# Patient Record
Sex: FEMALE | Race: WHITE
[De-identification: names, ages, dates, MRNs, and addresses within clinical notes are randomized per-mention and may not be internally consistent; named-entity substitution may affect disease eponyms.]

---

## 2018-12-15 ENCOUNTER — HOSPITAL ENCOUNTER (INPATIENT)
Dept: HOSPITAL 4 - SED | Age: 83
LOS: 2 days | Discharge: HOME | DRG: 281 | End: 2018-12-17
Attending: INTERNAL MEDICINE | Admitting: INTERNAL MEDICINE
Payer: MEDICARE

## 2018-12-15 VITALS — BODY MASS INDEX: 21.51 KG/M2 | HEIGHT: 64 IN | WEIGHT: 126 LBS

## 2018-12-15 VITALS — SYSTOLIC BLOOD PRESSURE: 158 MMHG

## 2018-12-15 DIAGNOSIS — I10: ICD-10-CM

## 2018-12-15 DIAGNOSIS — Z88.1: ICD-10-CM

## 2018-12-15 DIAGNOSIS — F03.90: ICD-10-CM

## 2018-12-15 DIAGNOSIS — M06.9: ICD-10-CM

## 2018-12-15 DIAGNOSIS — I21.A1: Primary | ICD-10-CM

## 2018-12-15 DIAGNOSIS — Z88.2: ICD-10-CM

## 2018-12-15 DIAGNOSIS — E44.1: ICD-10-CM

## 2018-12-15 PROCEDURE — G0378 HOSPITAL OBSERVATION PER HR: HCPCS

## 2018-12-15 NOTE — NUR
Patient brought in by EMS. aaox4 and able to verbalize her needs. Able to 
ambulate with a steady. Complaints s/p fall from standing position which was 
unwitnessed. Patient states she was twisting her body to place a device on a 
craddle. States from foot came out of her slippers and tripped. Patient hit her 
head on the table and injured left side and left head with with laceration. 
Pain is at 8/10 and the back. Denies any n/v and sob. Denies any fever.

## 2018-12-16 VITALS — SYSTOLIC BLOOD PRESSURE: 143 MMHG

## 2018-12-16 VITALS — SYSTOLIC BLOOD PRESSURE: 151 MMHG

## 2018-12-16 VITALS — SYSTOLIC BLOOD PRESSURE: 149 MMHG

## 2018-12-16 VITALS — SYSTOLIC BLOOD PRESSURE: 138 MMHG

## 2018-12-16 VITALS — SYSTOLIC BLOOD PRESSURE: 146 MMHG

## 2018-12-16 LAB
ALBUMIN SERPL BCP-MCNC: 2.9 G/DL (ref 3.4–4.8)
ALT SERPL W P-5'-P-CCNC: 13 U/L (ref 12–78)
ANION GAP SERPL CALCULATED.3IONS-SCNC: 8 MMOL/L (ref 5–15)
APPEARANCE UR: CLEAR
AST SERPL W P-5'-P-CCNC: 17 U/L (ref 10–37)
BACTERIA URNS QL MICRO: (no result) /HPF
BASOPHILS # BLD AUTO: 0 K/UL (ref 0–0.2)
BASOPHILS NFR BLD AUTO: 0.2 % (ref 0–2)
BILIRUB SERPL-MCNC: 0.4 MG/DL (ref 0–1)
BILIRUB UR QL STRIP: NEGATIVE
BUN SERPL-MCNC: 27 MG/DL (ref 8–21)
CALCIUM SERPL-MCNC: 9 MG/DL (ref 8.4–11)
CHLORIDE SERPL-SCNC: 104 MMOL/L (ref 98–107)
COLOR UR: YELLOW
CREAT SERPL-MCNC: 1 MG/DL (ref 0.55–1.3)
EOSINOPHIL # BLD AUTO: 0.2 K/UL (ref 0–0.4)
EOSINOPHIL NFR BLD AUTO: 1.7 % (ref 0–4)
ERYTHROCYTE [DISTWIDTH] IN BLOOD BY AUTOMATED COUNT: 14 % (ref 9–15)
GFR SERPL CREATININE-BSD FRML MDRD: (no result) ML/MIN (ref 90–?)
GLUCOSE SERPL-MCNC: 116 MG/DL (ref 70–99)
GLUCOSE UR STRIP-MCNC: NEGATIVE MG/DL
HCT VFR BLD AUTO: 33.6 % (ref 36–48)
HGB BLD-MCNC: 11.3 G/DL (ref 12–16)
HGB UR QL STRIP: NEGATIVE
KETONES UR STRIP-MCNC: NEGATIVE MG/DL
LEUKOCYTE ESTERASE UR QL STRIP: (no result)
LYMPHOCYTES # BLD AUTO: 0.8 K/UL (ref 1–5.5)
LYMPHOCYTES NFR BLD AUTO: 6.3 % (ref 20.5–51.5)
MCH RBC QN AUTO: 31 PG (ref 27–31)
MCHC RBC AUTO-ENTMCNC: 34 % (ref 32–36)
MCV RBC AUTO: 93 FL (ref 79–98)
MONOCYTES # BLD MANUAL: 0.9 K/UL (ref 0–1)
MONOCYTES # BLD MANUAL: 7.2 % (ref 1.7–9.3)
NEUTROPHILS # BLD AUTO: 10 K/UL (ref 1.8–7.7)
NEUTROPHILS NFR BLD AUTO: 84.6 % (ref 40–70)
NITRITE UR QL STRIP: NEGATIVE
PH UR STRIP: 6 [PH] (ref 5–8)
PLATELET # BLD AUTO: 187 K/UL (ref 130–430)
POTASSIUM SERPL-SCNC: 4.2 MMOL/L (ref 3.5–5.1)
PROT UR QL STRIP: NEGATIVE
RBC # BLD AUTO: 3.62 MIL/UL (ref 4.2–6.2)
RBC #/AREA URNS HPF: (no result) /HPF (ref 0–3)
SODIUM SERPLBLD-SCNC: 139 MMOL/L (ref 136–145)
SP GR UR STRIP: 1.02 (ref 1–1.03)
UROBILINOGEN UR STRIP-MCNC: 0.2 MG/DL (ref 0.2–1)
WBC # BLD AUTO: 11.9 K/UL (ref 4.8–10.8)
WBC #/AREA URNS HPF: (no result) /HPF (ref 0–3)

## 2018-12-16 RX ADMIN — Medication SCH MG: at 09:19

## 2018-12-16 RX ADMIN — Medication SCH MG: at 21:21

## 2018-12-16 RX ADMIN — Medication SCH MG: at 08:49

## 2018-12-16 RX ADMIN — DEXTROSE MONOHYDRATE SCH MLS/HR: 50 INJECTION, SOLUTION INTRAVENOUS at 08:48

## 2018-12-16 RX ADMIN — ENOXAPARIN SODIUM SCH MG: 40 INJECTION SUBCUTANEOUS at 08:49

## 2018-12-16 RX ADMIN — Medication SCH MG: at 15:13

## 2018-12-16 NOTE — NUR
INITIAL NOTES:

 PT is awake, alert, hard of hearing, assisted to bathroom, steady gait. no sob and 
distress, back to bed. pt os hard of hearing, redness noted at left and right buttocks, 
ecchymosis at left lower and and left elbow. needs attended. call light in reach. will give 
report to marisa moser night nurse.

## 2018-12-16 NOTE — NUR
Medication reconciliation completed with information provided by son. Any prior 
medication reconciliation on file was reviewed and corrected.

-------------------------------------------------------------------------------

Addendum: 12/16/18 at 0435 by [email or web address]

-------------------------------------------------------------------------------

Medications reconcilation given by son via phone. SonaDryl was reading 
medication bottles. Per pt, "those are not the right medications. You need to 
contact Cortney, my caregiver." Son gave nurse [facility] phone number to verify 
medication list, [facility] [phone] and mail order [phone]. Pharmacy did 
not answer.

## 2018-12-16 NOTE — NUR
Patient has a 0.5 cm laceration to left occiput.  Dr. Echeverria applied sivakumar 
using sterile technique.  Edges well approximated.  Site cleansed with 
Betadine.  No bleeding noted.  Pt tolerated well.

## 2018-12-16 NOTE — NUR
# 20 gauge angiocath placed to Left wrist.  Use of asceptic technique.  Opsite 
placed over site.  Blood return noted. Flushed with 10 cc of normal saline.  No 
evidence of infiltration noted.  Patient tolerated well.

## 2018-12-16 NOTE — NUR
ASSISTED TO BATHROOM TO VOID,WITH STEADY GAIT. BOTH HAND FINGERS DEFORMED DUE TO RA AND OA. 
PAIN TOLERABLE. REFUSE PAIN MEDS.

## 2018-12-16 NOTE — NUR
Transfer to Telemetry via ACLS protocol. Licensed nurse present. IV present no 
signs or symptoms of infiltration.

## 2018-12-16 NOTE — NUR
RN ROUNDING:

PATIENT JUST FINISHED HAVING LUNCH,REQUESTED TO PUT AWAY THE TRAY. NO DISTRESS. CALL LIGHT 
WITH IN REACH. BED LOCKED AT LOWEST POSITION. BED ALARM ON.

## 2018-12-16 NOTE — NUR
Patient will be admitted to care of Dr. Abdi.  Admitted to Telemetry unit.  
Will go to room 104 B.  Belongings list completed.  Summary report printed. 
Report will be given at bedside.

## 2018-12-16 NOTE — NUR
SKIN INTEGRITY:

POSTERIOR HEAD WITH SMALL LACERATION WITH 3 STAPLES. NO REDNESS NOR DRAINAGE. BOTH ELBOWS  
LEFT FLANK ,HIPS , AND SACRAL AREAS WITH REDNESS AND BRUISING.NO SKIN BREAKDOWN.

## 2018-12-16 NOTE — NUR
MEDS ADMIN:

PATIENT RESTING QUITELY. HARD OF HEARING IN BOTH EARS WITH HEARING AIDS ON.TOOK ALL DUE  PO 
MEDS WITHOUT DIFFICULTY.SALINE LOCK FLUSHED EASILY.

## 2018-12-16 NOTE — NUR
CLOSING NOTES:

PATIENT ON THE BED.RESTING.JUST ATE DINNER. CALL LIGHT WITH IN REACH. BED LOCKED AT LOWEST 
POSITION.BED ALARM ON.CONTINUE TO MONITOR.

## 2018-12-16 NOTE — NUR
cardio rounds:

Dr gotti came and saw the patient ,spoke to patient's son and patient ,updates and plan of 
care given.

## 2018-12-16 NOTE — NUR
Closing Notes



Patient resting in bed with eyes closed, easily aroused.  Patient denies any acute distress 
or pain at this time.  Breathing is even and unlabored.  IV site patent/clean/dry, no S/S 
infection/infiltration noted.  Needs addressed throughout shift.  Call light in hand, fall 
precautions in place.  Will continue to monitor for changes and safety and endorse all 
patient care/needs to oncoming nurse.

## 2018-12-16 NOTE — NUR
ADMISSION NOTE

Received patient from ER via peter, received report from KP TURNER. Patient admitted with 
diagnosis of ELEVATED TROPONIN. Patient oriented to hospital routine, call light, toileting 
and safety-patient verbalized understanding.

## 2018-12-16 NOTE — NUR
Am Rounds:

Patient awake during rounds. Bedside report given by night nurse frank. Hard of hearing. Not 
in any distress. Call light with in reach. Bed locked at lowest position. Bed alarm on.

## 2018-12-16 NOTE — NUR
cardio:

Spoke with Dr. gotti and aware about the consult and relayed elevated troponin.Ordered echo 
and repeat troponin,but Dr lake put the order in already.

## 2018-12-16 NOTE — NUR
BRP WITH ASSIST:

STUDENT NURSE ASSISTED PATIENT TO THE TOILET,GOOD GAIT.STUDENT NURSE STAYED WITH THE PATIENT 
WHILE IN THE TOILET.NEEDS SUPERVISION AT ALL TIMES.

## 2018-12-17 VITALS — SYSTOLIC BLOOD PRESSURE: 143 MMHG

## 2018-12-17 VITALS — SYSTOLIC BLOOD PRESSURE: 137 MMHG

## 2018-12-17 VITALS — SYSTOLIC BLOOD PRESSURE: 161 MMHG

## 2018-12-17 LAB
ALBUMIN SERPL BCP-MCNC: 2.8 G/DL (ref 3.4–4.8)
ALT SERPL W P-5'-P-CCNC: 14 U/L (ref 12–78)
ANION GAP SERPL CALCULATED.3IONS-SCNC: 7 MMOL/L (ref 5–15)
AST SERPL W P-5'-P-CCNC: 24 U/L (ref 10–37)
BASOPHILS # BLD AUTO: 0 K/UL (ref 0–0.2)
BASOPHILS NFR BLD AUTO: 0.5 % (ref 0–2)
BILIRUB SERPL-MCNC: 0.8 MG/DL (ref 0–1)
BUN SERPL-MCNC: 20 MG/DL (ref 8–21)
CALCIUM SERPL-MCNC: 9.4 MG/DL (ref 8.4–11)
CHLORIDE SERPL-SCNC: 106 MMOL/L (ref 98–107)
CREAT SERPL-MCNC: 0.88 MG/DL (ref 0.55–1.3)
EOSINOPHIL # BLD AUTO: 0.2 K/UL (ref 0–0.4)
EOSINOPHIL NFR BLD AUTO: 3.7 % (ref 0–4)
ERYTHROCYTE [DISTWIDTH] IN BLOOD BY AUTOMATED COUNT: 14.3 % (ref 9–15)
GFR SERPL CREATININE-BSD FRML MDRD: (no result) ML/MIN (ref 90–?)
GLUCOSE SERPL-MCNC: 85 MG/DL (ref 70–99)
HCT VFR BLD AUTO: 31.3 % (ref 36–48)
HGB BLD-MCNC: 10.7 G/DL (ref 12–16)
LYMPHOCYTES # BLD AUTO: 0.9 K/UL (ref 1–5.5)
LYMPHOCYTES NFR BLD AUTO: 15.5 % (ref 20.5–51.5)
MCH RBC QN AUTO: 31 PG (ref 27–31)
MCHC RBC AUTO-ENTMCNC: 34 % (ref 32–36)
MCV RBC AUTO: 92 FL (ref 79–98)
MONOCYTES # BLD MANUAL: 0.5 K/UL (ref 0–1)
MONOCYTES # BLD MANUAL: 8.2 % (ref 1.7–9.3)
NEUTROPHILS # BLD AUTO: 3.9 K/UL (ref 1.8–7.7)
NEUTROPHILS NFR BLD AUTO: 72.1 % (ref 40–70)
PLATELET # BLD AUTO: 166 K/UL (ref 130–430)
POTASSIUM SERPL-SCNC: 4.3 MMOL/L (ref 3.5–5.1)
RBC # BLD AUTO: 3.41 MIL/UL (ref 4.2–6.2)
SODIUM SERPLBLD-SCNC: 141 MMOL/L (ref 136–145)
WBC # BLD AUTO: 5.5 K/UL (ref 4.8–10.8)

## 2018-12-17 RX ADMIN — ENOXAPARIN SODIUM SCH MG: 40 INJECTION SUBCUTANEOUS at 09:13

## 2018-12-17 RX ADMIN — DEXTROSE MONOHYDRATE SCH MLS/HR: 50 INJECTION, SOLUTION INTRAVENOUS at 09:12

## 2018-12-17 RX ADMIN — Medication SCH MG: at 09:13

## 2018-12-17 NOTE — NUR
CALLED TO VOID IN THE BATHROOM. ASSISTED ,GAIT STEADY. DENIES CHEST PAIN. HAS TOLERABLE 
PAIN. REFUSE PAIN MEDS.

## 2018-12-17 NOTE — NUR
transition of care note

All transition of care instructions were provided to the Katelin Oscar, the patient's 
daughter. She verbalized understanding of all. IV and ID band were removed. Patient  left 
the unit in stable condition.

## 2018-12-17 NOTE — NUR
CLOSING:

ORIENTED X3. HAD BEEN ASSISTING TO BATHROOM TO VOID. OFFERED PAIN MED X3 BUT REFUSE. 
VERBALIZED FEELS OKAY WITHOUT ANY DRUGS. PAIN TOLERABLE. NO ACUTE CARDIOPULMONARY DISTRESS. 
CALL LIGHT WITHIN REACH. SAFETY MEASURES OBSERVED. BED ALARM ON.

## 2018-12-17 NOTE — NUR
Nutrition Update



Krishna Scale 17 noted.

Pt admitted for elevated troponin

Diet: cardiac low cholesterol low fat 2gm Na diet

BMI: 21.6 kg/m2

RD to follow per nutrition care standards.

## 2018-12-17 NOTE — NUR
Opening Note

Report received from [facility] shift nurse. Patient is resting in bed. No signs of distress noted. 
IV is on the left wrist 20g, saline locked. Call light is within reach and bed is in low 
position. Will continue to monitor troponin levels.

## 2018-12-19 NOTE — NUR
DISCHARGE FOLLOW UP PHONE CALL:

MSW phoned pt who states she is doing fair. Pt states she has an appointment with PCP, Dr. Gonzalez on 12/21 and dtr will be driving her to MD appointment. Pt did not have any 
questions about discharge instructions but asked information regarding [facility]. MSW provided 
[facility] phone number. No further needs/concerns at this time but will call if any arise.

## 2019-11-05 ENCOUNTER — HOSPITAL ENCOUNTER (EMERGENCY)
Dept: HOSPITAL 4 - SED | Age: 84
Discharge: HOME | End: 2019-11-05
Payer: COMMERCIAL

## 2019-11-05 VITALS — HEIGHT: 64 IN | BODY MASS INDEX: 19.63 KG/M2 | WEIGHT: 115 LBS

## 2019-11-05 VITALS — SYSTOLIC BLOOD PRESSURE: 136 MMHG

## 2019-11-05 VITALS — SYSTOLIC BLOOD PRESSURE: 159 MMHG

## 2019-11-05 DIAGNOSIS — S46.912A: ICD-10-CM

## 2019-11-05 DIAGNOSIS — R03.0: ICD-10-CM

## 2019-11-05 DIAGNOSIS — J45.909: ICD-10-CM
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
## 2019-11-05 DIAGNOSIS — Z79.82: ICD-10-CM
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
## 2019-11-05 DIAGNOSIS — Z79.899: ICD-10-CM

## 2019-11-05 DIAGNOSIS — Z88.1: ICD-10-CM

## 2019-11-05 DIAGNOSIS — S20.222A: ICD-10-CM

## 2019-11-05 DIAGNOSIS — S51.011A: Primary | ICD-10-CM

## 2019-11-05 RX ADMIN — CLOSTRIDIUM TETANI TOXOID ANTIGEN (FORMALDEHYDE INACTIVATED) AND CORYNEBACTERIUM DIPHTHERIAE TOXOID ANTIGEN (FORMALDEHYDE INACTIVATED) ONE ML: 5; 2 INJECTION, SUSPENSION INTRAMUSCULAR at 16:02

## 2019-11-05 RX ADMIN — BACITRACIN ZINC ONE GM: 500 OINTMENT TOPICAL at 16:01

## 2019-11-05 RX ADMIN — HYDROMORPHONE HYDROCHLORIDE ONE MG: 8 TABLET ORAL at 16:01

## 2019-12-19 ENCOUNTER — HOSPITAL ENCOUNTER (EMERGENCY)
Dept: HOSPITAL 4 - SED | Age: 84
Discharge: HOME | End: 2019-12-19
Payer: MEDICARE

## 2019-12-19 VITALS — BODY MASS INDEX: 19.63 KG/M2 | HEIGHT: 64 IN | WEIGHT: 115 LBS

## 2019-12-19 VITALS — SYSTOLIC BLOOD PRESSURE: 148 MMHG

## 2019-12-19 VITALS — SYSTOLIC BLOOD PRESSURE: 160 MMHG

## 2019-12-19 DIAGNOSIS — J45.909: ICD-10-CM

## 2019-12-19 DIAGNOSIS — Z79.899: ICD-10-CM
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
## 2019-12-19 DIAGNOSIS — Z79.82: ICD-10-CM

## 2019-12-19 DIAGNOSIS — S01.81XA: ICD-10-CM

## 2019-12-19 DIAGNOSIS — S01.511A: Primary | ICD-10-CM

## 2019-12-19 DIAGNOSIS — S51.812A: ICD-10-CM

## 2019-12-19 DIAGNOSIS — S51.811A: ICD-10-CM

## 2019-12-19 DIAGNOSIS — Z88.1: ICD-10-CM

## 2019-12-19 PROCEDURE — 99284 EMERGENCY DEPT VISIT MOD MDM: CPT

## 2019-12-19 PROCEDURE — 12013 RPR F/E/E/N/L/M 2.6-5.0 CM: CPT

## 2019-12-19 NOTE — NUR
Patient has a 2 cm laceration to Chin .  Dr. Joseph applied sutures using sterile 
technique.  Edges well approximated.  Site cleansed with sterile water.  
Dressing of nonadhesive gauze  applied to site.  No bleeding noted.  Pt 
tolerated well.

## 2019-12-19 NOTE — NUR
Patient presented to ER with lip laceration. Patient A&Ox4, [place], 
laceration:Lip, Left forarm & chin. skin tear: Left knuckles, Right arm, cap 
refill <3, pain5/10, denies N/V/D. Patient states she fell at home while phone 
was ringing. Daughter of patient at bedside states fall was unwittnessed.

## 2019-12-19 NOTE — NUR
Patient given written and verbal discharge instructions and verbalizes 
understanding.  ER MD Joseph discussed with patient the results and treatment 
provided. Patient in stable condition. ID arm band removed. Rx of Clindamycin 
given. Patient educated on pain management and to follow up with PMD. Pain 
Scale 0. Opportunity for questions provided and answered. Medication side 
effect fact sheet provided.

## 2020-07-29 ENCOUNTER — HOSPITAL ENCOUNTER (EMERGENCY)
Dept: HOSPITAL 4 - SED | Age: 85
Discharge: HOME | End: 2020-07-29
Payer: MEDICARE

## 2020-07-29 VITALS — HEIGHT: 61 IN | WEIGHT: 125 LBS | BODY MASS INDEX: 23.6 KG/M2

## 2020-07-29 VITALS — SYSTOLIC BLOOD PRESSURE: 154 MMHG

## 2020-07-29 DIAGNOSIS — Z79.899: ICD-10-CM

## 2020-07-29 DIAGNOSIS — S01.01XA: Primary | ICD-10-CM
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
## 2020-07-29 DIAGNOSIS — J45.909: ICD-10-CM

## 2020-07-29 DIAGNOSIS — M06.9: ICD-10-CM

## 2020-07-29 DIAGNOSIS — Z79.82: ICD-10-CM
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
## 2020-07-29 DIAGNOSIS — Z88.1: ICD-10-CM
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
## 2020-07-29 NOTE — NUR
pt in [facility] with side rails up. GCS=15, alert and oriented to name, place and 
event. VSS. dressing to head wound inplace. bleeding controlled. patient denies 
any knock out event and was aware at all times.

## 2020-07-29 NOTE — NUR
ELDA, DAUGHTER OF PT, CALLED TO GIVE MORE CONTACT NUMBERS. 

H: [phone]

AMAYA (ELDA'S ) : [phone]

## 2020-07-29 NOTE — NUR
Patient given written and verbal discharge instructions and verbalizes 
understanding.  ER MD SCHROEDER discussed with patient the results and treatment 
provided. Patient in stable condition. ID arm band removed.

Patient educated on pain management and to follow up with PMD. Pain Scale 1/10.

Opportunity for questions provided and answered. Medication side effect fact 
sheet provided.

## 2022-02-26 ENCOUNTER — HOSPITAL ENCOUNTER (EMERGENCY)
Dept: HOSPITAL 4 - SED | Age: 87
Discharge: HOME | End: 2022-02-26
Payer: MEDICARE

## 2022-02-26 VITALS — WEIGHT: 120 LBS | HEIGHT: 62 IN | BODY MASS INDEX: 22.08 KG/M2 | SYSTOLIC BLOOD PRESSURE: 146 MMHG

## 2022-02-26 VITALS — SYSTOLIC BLOOD PRESSURE: 160 MMHG

## 2022-02-26 DIAGNOSIS — I10: ICD-10-CM

## 2022-02-26 DIAGNOSIS — F03.90: Primary | ICD-10-CM

## 2022-02-26 LAB
ALBUMIN SERPL BCP-MCNC: 3.3 G/DL (ref 3.4–4.8)
ALT SERPL W P-5'-P-CCNC: 17 U/L (ref 12–78)
ANION GAP SERPL CALCULATED.3IONS-SCNC: 7 MMOL/L (ref 5–15)
APPEARANCE UR: CLEAR
AST SERPL W P-5'-P-CCNC: 22 U/L (ref 10–37)
BASOPHILS # BLD AUTO: 0.1 K/UL (ref 0–0.2)
BASOPHILS NFR BLD AUTO: 1.1 % (ref 0–2)
BILIRUB SERPL-MCNC: 0.4 MG/DL (ref 0–1)
BILIRUB UR QL STRIP: NEGATIVE
BUN SERPL-MCNC: 30 MG/DL (ref 8–21)
CALCIUM SERPL-MCNC: 8.8 MG/DL (ref 8.4–11)
CHLORIDE SERPL-SCNC: 102 MMOL/L (ref 98–107)
COLOR UR: YELLOW
CREAT SERPL-MCNC: 0.82 MG/DL (ref 0.55–1.3)
EOSINOPHIL # BLD AUTO: 0.2 K/UL (ref 0–0.4)
EOSINOPHIL NFR BLD AUTO: 2.7 % (ref 0–4)
ERYTHROCYTE [DISTWIDTH] IN BLOOD BY AUTOMATED COUNT: 14.7 % (ref 9–15)
GFR SERPL CREATININE-BSD FRML MDRD: (no result) ML/MIN (ref 90–?)
GLUCOSE SERPL-MCNC: 107 MG/DL (ref 70–99)
GLUCOSE UR STRIP-MCNC: NEGATIVE MG/DL
HCT VFR BLD AUTO: 35.3 % (ref 36–48)
HGB BLD-MCNC: 11.5 G/DL (ref 12–16)
HGB UR QL STRIP: (no result)
KETONES UR STRIP-MCNC: NEGATIVE MG/DL
LEUKOCYTE ESTERASE UR QL STRIP: NEGATIVE
LYMPHOCYTES # BLD AUTO: 0.6 K/UL (ref 1–5.5)
LYMPHOCYTES NFR BLD AUTO: 8.6 % (ref 20.5–51.5)
MCH RBC QN AUTO: 28 PG (ref 27–31)
MCHC RBC AUTO-ENTMCNC: 33 % (ref 32–36)
MCV RBC AUTO: 84 FL (ref 79–98)
MONOCYTES # BLD MANUAL: 0.4 K/UL (ref 0–1)
MONOCYTES # BLD MANUAL: 5.8 % (ref 1.7–9.3)
NEUTROPHILS # BLD AUTO: 5.8 K/UL (ref 1.8–7.7)
NEUTROPHILS NFR BLD AUTO: 81.8 % (ref 40–70)
NITRITE UR QL STRIP: NEGATIVE
PH UR STRIP: 5.5 [PH] (ref 5–8)
PLATELET # BLD AUTO: 192 K/UL (ref 130–430)
POTASSIUM SERPL-SCNC: 4.5 MMOL/L (ref 3.5–5.1)
PROT UR QL STRIP: NEGATIVE
RBC # BLD AUTO: 4.2 MIL/UL (ref 4.2–6.2)
RBC #/AREA URNS HPF: (no result) /HPF (ref 0–3)
SODIUM SERPLBLD-SCNC: 137 MMOL/L (ref 136–145)
SP GR UR STRIP: 1.02 (ref 1–1.03)
UROBILINOGEN UR STRIP-MCNC: 0.2 MG/DL (ref 0.2–1)
WBC # BLD AUTO: 7.1 K/UL (ref 4.8–10.8)
WBC #/AREA URNS HPF: (no result) /HPF (ref 0–3)

## 2022-10-17 ENCOUNTER — HOSPITAL ENCOUNTER (INPATIENT)
Dept: HOSPITAL 4 - SED | Age: 87
LOS: 2 days | Discharge: HOME HEALTH | DRG: 562 | End: 2022-10-19
Attending: INTERNAL MEDICINE | Admitting: INTERNAL MEDICINE
Payer: MEDICARE

## 2022-10-17 VITALS — BODY MASS INDEX: 27.6 KG/M2 | WEIGHT: 150 LBS | HEIGHT: 62 IN

## 2022-10-17 VITALS — SYSTOLIC BLOOD PRESSURE: 139 MMHG
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
## 2022-10-17 DIAGNOSIS — Z20.822: ICD-10-CM

## 2022-10-17 DIAGNOSIS — F03.90: ICD-10-CM
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
## 2022-10-17 DIAGNOSIS — S52.022A: Primary | ICD-10-CM

## 2022-10-17 DIAGNOSIS — M47.812: ICD-10-CM

## 2022-10-17 DIAGNOSIS — M06.9: ICD-10-CM

## 2022-10-17 DIAGNOSIS — Z79.899: ICD-10-CM

## 2022-10-17 DIAGNOSIS — N39.0: ICD-10-CM
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
## 2022-10-17 DIAGNOSIS — N17.0: ICD-10-CM

## 2022-10-17 DIAGNOSIS — Z88.8: ICD-10-CM

## 2022-10-17 DIAGNOSIS — E07.9: ICD-10-CM

## 2022-10-17 DIAGNOSIS — E78.5: ICD-10-CM

## 2022-10-17 LAB
ALBUMIN SERPL BCP-MCNC: 3.5 G/DL (ref 3.4–4.8)
ALT SERPL W P-5'-P-CCNC: 15 U/L (ref 12–78)
ANION GAP SERPL CALCULATED.3IONS-SCNC: 8 MMOL/L (ref 5–15)
AST SERPL W P-5'-P-CCNC: 17 U/L (ref 10–37)
BASOPHILS # BLD AUTO: 0.1 K/UL (ref 0–0.2)
BASOPHILS NFR BLD AUTO: 0.5 % (ref 0–2)
BILIRUB SERPL-MCNC: 0.3 MG/DL (ref 0–1)
BILIRUB UR QL STRIP: NEGATIVE
BUN SERPL-MCNC: 31 MG/DL (ref 8–21)
CALCIUM SERPL-MCNC: 9.1 MG/DL (ref 8.4–11)
CHLORIDE SERPL-SCNC: 107 MMOL/L (ref 98–107)
COLOR UR: YELLOW
CREAT SERPL-MCNC: 1.66 MG/DL (ref 0.55–1.3)
EOSINOPHIL # BLD AUTO: 0.1 K/UL (ref 0–0.4)
EOSINOPHIL NFR BLD AUTO: 1.2 % (ref 0–4)
ERYTHROCYTE [DISTWIDTH] IN BLOOD BY AUTOMATED COUNT: 14.2 % (ref 9–15)
GFR SERPL CREATININE-BSD FRML MDRD: (no result) ML/MIN (ref 90–?)
GLUCOSE SERPL-MCNC: 145 MG/DL (ref 70–99)
GLUCOSE UR STRIP-MCNC: (no result) MG/DL
HCT VFR BLD AUTO: 36.5 % (ref 36–48)
HGB BLD-MCNC: 12.1 G/DL (ref 12–16)
HGB UR QL STRIP: NEGATIVE
KETONES UR STRIP-MCNC: (no result) MG/DL
LEUKOCYTE ESTERASE UR QL STRIP: (no result)
LYMPHOCYTES # BLD AUTO: 0.6 K/UL (ref 1–5.5)
LYMPHOCYTES NFR BLD AUTO: 5.7 % (ref 20.5–51.5)
MCH RBC QN AUTO: 29 PG (ref 27–31)
MCHC RBC AUTO-ENTMCNC: 33 % (ref 32–36)
MCV RBC AUTO: 89 FL (ref 79–98)
MONOCYTES # BLD MANUAL: 0.8 K/UL (ref 0–1)
MONOCYTES # BLD MANUAL: 6.9 % (ref 1.7–9.3)
NEUTROPHILS # BLD AUTO: 9.6 K/UL (ref 1.8–7.7)
NEUTROPHILS NFR BLD AUTO: 85.7 % (ref 40–70)
NITRITE UR QL STRIP: NEGATIVE
PH UR STRIP: 6 [PH] (ref 5–8)
PLATELET # BLD AUTO: 150 K/UL (ref 130–430)
POTASSIUM SERPL-SCNC: 4.7 MMOL/L (ref 3.5–5.1)
PROT UR QL STRIP: (no result)
RBC # BLD AUTO: 4.12 MIL/UL (ref 4.2–6.2)
SP GR UR STRIP: 1.02 (ref 1–1.03)
UROBILINOGEN UR STRIP-MCNC: 0.2 MG/DL (ref 0.2–1)
WBC # BLD AUTO: 11.1 K/UL (ref 4.8–10.8)

## 2022-10-17 PROCEDURE — G0378 HOSPITAL OBSERVATION PER HR: HCPCS

## 2022-10-17 NOTE — NUR
Pt from home BIBA for mechanical fall. Pt slipped and fell on the way to 
mailbox and walked back into the house. Pt c/o of pain to the left elbow 
radiating to shoulder and hematoma to the back of the head. Pt reports 5/10. 
VSS. Hx of dementia.

## 2022-10-18 VITALS — SYSTOLIC BLOOD PRESSURE: 143 MMHG

## 2022-10-18 VITALS — SYSTOLIC BLOOD PRESSURE: 136 MMHG

## 2022-10-18 VITALS — SYSTOLIC BLOOD PRESSURE: 113 MMHG

## 2022-10-18 VITALS — SYSTOLIC BLOOD PRESSURE: 131 MMHG

## 2022-10-18 LAB
APPEARANCE UR: (no result)
BACTERIA URNS QL MICRO: (no result) /HPF
CRP SERPL-MCNC: 3.7 MG/DL (ref 0–0.5)
MUCOUS THREADS URNS QL MICRO: (no result) /LPF
RBC #/AREA URNS HPF: (no result) /HPF (ref 0–3)
TSH SERPL DL<=0.05 MIU/L-ACNC: 0.88 UIU/ML (ref 0.34–4.82)
WBC #/AREA URNS HPF: (no result) /HPF (ref 0–3)

## 2022-10-18 RX ADMIN — CEFTRIAXONE SODIUM SCH MLS/HR: 1 INJECTION, SOLUTION INTRAVENOUS at 13:58

## 2022-10-18 NOTE — NUR
FAMILY

SPOKE WITH DAUGHTER ELDA, RECEIVED INFORMATIONS REGARDING PATIENT. ANSWERED ALL QUESTIONS 
DAUGHTER VERBALIZED UNDERSTANDING.

## 2022-10-18 NOTE — NUR
Admit bed requested 



Patient will be admitted to care of 

Admitted to MS unit.

Diagnosis Mechanical Fall

Inpatient (Yes or No)  yes

Observation (Yes or No) no

Orientation concerns or request close to nursing station  (Yes or No) no

Covid Status pending

On vent or bipap no

Isolation requirements no

Needs a sitter no

From Home (Yes or if No enter name of facility) yes

Requires Dialysis (Yes or No) no

Med Rec Completed (Yes of No) pending

## 2022-10-18 NOTE — NUR
MD DR THOMAS BEDSIDE EXAMINING PATIENT, SLING ORDERED.  NO SURGERY FOLLOWUP IN HIS OFFICE IN 2 
WEEKS

## 2022-10-18 NOTE — NUR
Patient will be admitted to care of Dr Garcia.  Admitted to med surg unit.  Will 
go to room 103A.  Belongings list completed.  Complete and up to date summary 
report printed. SBAR report given to YUE Angeles at bedside with opportunity 
for questions.

## 2022-10-18 NOTE — NUR
FAMILY

SPOKE WITH DAUGHTER ELDA, UPDATED HER ON STATUS OF PATIENT, ANSWERED ALL QUESTIONS, 
DAUGHTER VERBALIZED UNDERSTANDING.

## 2022-10-18 NOTE — NUR
CLOSING

PROVIDED SBAR TO NIGHT NURSE, ENDORSED DVT PROPHYLAXIS REQUEST AND L ELBOW AND BUTTOCKS 
PICTURE FOR SKIN ECCHYMOSIS., PATIENT IN BED, RESPIRATION EVEN AND NON LABORED, BED LOW AND 
LOCKED, ALARM BED ON, IV RUNNING AS ORDERED

## 2022-10-18 NOTE — NUR
NEW ADMISSION FROM ED, S/P MECHANICAL FALL, PATIENT HAS LACERATION TO LEFT ELBOW, INJURY TO 
TO RIGHT HIP AND LEFT ARM V/S STABLE. PATIENT IS CONTINENT OF URINE AND MAGED HARD OF HEARING. 
UNABLE TO GET PERTINENT INFORMATION FROM PATIENT

## 2022-10-18 NOTE — NUR
consultation

CALLED DR TORSTEN SHANNON FOR CONSULTATION REGARDING DISPLACED LEFT ELBOW.  SPOKE WITH CLEOPATRA

-------------------------------------------------------------------------------

Addendum: 10/18/22 at 1141 by Tatiana Laguerre RN

-------------------------------------------------------------------------------

PER CLEOPATRA WILL BE SEEN BY DR THOMAS

## 2022-10-19 VITALS — SYSTOLIC BLOOD PRESSURE: 158 MMHG

## 2022-10-19 VITALS — SYSTOLIC BLOOD PRESSURE: 139 MMHG

## 2022-10-19 VITALS — SYSTOLIC BLOOD PRESSURE: 131 MMHG

## 2022-10-19 LAB
ANION GAP SERPL CALCULATED.3IONS-SCNC: 6 MMOL/L (ref 5–15)
BASOPHILS # BLD AUTO: 0 K/UL (ref 0–0.2)
BASOPHILS NFR BLD AUTO: 0.5 % (ref 0–2)
BUN SERPL-MCNC: 22 MG/DL (ref 8–21)
CALCIUM SERPL-MCNC: 8.9 MG/DL (ref 8.4–11)
CHLORIDE SERPL-SCNC: 105 MMOL/L (ref 98–107)
CREAT SERPL-MCNC: 1.1 MG/DL (ref 0.55–1.3)
EOSINOPHIL # BLD AUTO: 0.3 K/UL (ref 0–0.4)
EOSINOPHIL NFR BLD AUTO: 4.2 % (ref 0–4)
ERYTHROCYTE [DISTWIDTH] IN BLOOD BY AUTOMATED COUNT: 14.4 % (ref 9–15)
GFR SERPL CREATININE-BSD FRML MDRD: (no result) ML/MIN (ref 90–?)
GLUCOSE SERPL-MCNC: 109 MG/DL (ref 70–99)
HCT VFR BLD AUTO: 33.9 % (ref 36–48)
HGB BLD-MCNC: 11.1 G/DL (ref 12–16)
LYMPHOCYTES # BLD AUTO: 1.4 K/UL (ref 1–5.5)
LYMPHOCYTES NFR BLD AUTO: 22.3 % (ref 20.5–51.5)
MCH RBC QN AUTO: 29 PG (ref 27–31)
MCHC RBC AUTO-ENTMCNC: 33 % (ref 32–36)
MCV RBC AUTO: 89 FL (ref 79–98)
MONOCYTES # BLD MANUAL: 0.6 K/UL (ref 0–1)
MONOCYTES # BLD MANUAL: 10.3 % (ref 1.7–9.3)
NEUTROPHILS # BLD AUTO: 3.9 K/UL (ref 1.8–7.7)
NEUTROPHILS NFR BLD AUTO: 62.7 % (ref 40–70)
PLATELET # BLD AUTO: 146 K/UL (ref 130–430)
POTASSIUM SERPL-SCNC: 4.3 MMOL/L (ref 3.5–5.1)
RBC # BLD AUTO: 3.82 MIL/UL (ref 4.2–6.2)
WBC # BLD AUTO: 6.2 K/UL (ref 4.8–10.8)

## 2022-10-19 RX ADMIN — CEFTRIAXONE SODIUM SCH MLS/HR: 1 INJECTION, SOLUTION INTRAVENOUS at 13:20

## 2022-10-19 NOTE — NUR
RE- HOME HEALTH

     SPOKE TO DAMIAN CHEATHAM DC PLAN OF HOME HEALTH FOR PT/OT, DAMIAN MADE AWARE.

## 2022-10-19 NOTE — NUR
Patient discharged home. PIV and tele monitor removed. Discharge instructions explained and 
given to the daughter. The daughter transported the patient home.

## 2022-10-20 NOTE — NUR
***** PHYSICAL THERAPY CO-SIGN *****

The Physical Therapy Progress Notes documented by Physical Therapy Assistant have been 
reviewed.



Reviewed/Co-Signed by:  Dontrell Butler

Documentation Done by:  JOSE RAMON YOUNG PTA

-------------------------------------------------------------------------------

Addendum: 10/20/22 at 0709 by Dontrell Butler PT

-------------------------------------------------------------------------------

Amended: Links added.

## 2022-11-20 ENCOUNTER — HOSPITAL ENCOUNTER (EMERGENCY)
Dept: HOSPITAL 4 - SED | Age: 87
Discharge: HOME | End: 2022-11-20
Payer: COMMERCIAL

## 2022-11-20 VITALS — SYSTOLIC BLOOD PRESSURE: 162 MMHG

## 2022-11-20 VITALS — WEIGHT: 130 LBS | BODY MASS INDEX: 22.2 KG/M2 | HEIGHT: 64 IN

## 2022-11-20 DIAGNOSIS — Z88.1: ICD-10-CM

## 2022-11-20 DIAGNOSIS — R60.0: Primary | ICD-10-CM

## 2022-11-20 DIAGNOSIS — Z79.899: ICD-10-CM

## 2022-11-20 DIAGNOSIS — J45.909: ICD-10-CM

## 2022-11-20 LAB
ALBUMIN SERPL BCP-MCNC: 3 G/DL (ref 3.4–4.8)
ALT SERPL W P-5'-P-CCNC: 16 U/L (ref 12–78)
ANION GAP SERPL CALCULATED.3IONS-SCNC: 7 MMOL/L (ref 5–15)
AST SERPL W P-5'-P-CCNC: 23 U/L (ref 10–37)
BASOPHILS # BLD AUTO: 0 K/UL (ref 0–0.2)
BASOPHILS NFR BLD AUTO: 0.8 % (ref 0–2)
BILIRUB SERPL-MCNC: 0.3 MG/DL (ref 0–1)
BUN SERPL-MCNC: 23 MG/DL (ref 8–21)
CALCIUM SERPL-MCNC: 9.9 MG/DL (ref 8.4–11)
CHLORIDE SERPL-SCNC: 105 MMOL/L (ref 98–107)
CREAT SERPL-MCNC: 0.99 MG/DL (ref 0.55–1.3)
EOSINOPHIL # BLD AUTO: 0.2 K/UL (ref 0–0.4)
EOSINOPHIL NFR BLD AUTO: 3.3 % (ref 0–4)
ERYTHROCYTE [DISTWIDTH] IN BLOOD BY AUTOMATED COUNT: 14.2 % (ref 9–15)
GFR SERPL CREATININE-BSD FRML MDRD: (no result) ML/MIN (ref 90–?)
GLUCOSE SERPL-MCNC: 82 MG/DL (ref 70–99)
HCT VFR BLD AUTO: 31.8 % (ref 36–48)
HGB BLD-MCNC: 10.7 G/DL (ref 12–16)
LYMPHOCYTES # BLD AUTO: 0.8 K/UL (ref 1–5.5)
LYMPHOCYTES NFR BLD AUTO: 16.8 % (ref 20.5–51.5)
MCH RBC QN AUTO: 29 PG (ref 27–31)
MCHC RBC AUTO-ENTMCNC: 34 % (ref 32–36)
MCV RBC AUTO: 87 FL (ref 79–98)
MONOCYTES # BLD MANUAL: 0.7 K/UL (ref 0–1)
MONOCYTES # BLD MANUAL: 13.4 % (ref 1.7–9.3)
NEUTROPHILS # BLD AUTO: 3.2 K/UL (ref 1.8–7.7)
NEUTROPHILS NFR BLD AUTO: 65.7 % (ref 40–70)
PLATELET # BLD AUTO: 160 K/UL (ref 130–430)
RBC # BLD AUTO: 3.67 MIL/UL (ref 4.2–6.2)
WBC # BLD AUTO: 4.9 K/UL (ref 4.8–10.8)

## 2024-05-12 NOTE — NUR
OPENING

RECEIVED SBAR FROM NIGHT NURSE, PATIENT IS LAYING IN BED WITH EYES CLOSED, RESPIRATIONS ARE 
EVEN AND NON LABORED, BED IS LOW, LOCKED AND ALAMR ON, CALL LIGHT WITH IN REACH 12-May-2024 14:02